# Patient Record
Sex: MALE | Race: WHITE
[De-identification: names, ages, dates, MRNs, and addresses within clinical notes are randomized per-mention and may not be internally consistent; named-entity substitution may affect disease eponyms.]

---

## 2019-02-18 ENCOUNTER — HOSPITAL ENCOUNTER (EMERGENCY)
Dept: HOSPITAL 80 - FED | Age: 24
LOS: 1 days | Discharge: HOME | End: 2019-02-19
Payer: COMMERCIAL

## 2019-02-18 DIAGNOSIS — M24.412: Primary | ICD-10-CM
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
## 2019-02-18 PROCEDURE — 0RSKXZZ REPOSITION LEFT SHOULDER JOINT, EXTERNAL APPROACH: ICD-10-PCS | Performed by: EMERGENCY MEDICINE

## 2019-02-18 NOTE — EDPHY
H & P


Stated Complaint: LEFT shoulder dislocation playing volleyball. +2 pulse


Time Seen by Provider: 02/18/19 23:20


HPI/ROS: 





HPI





CHIEF COMPLAINT:  Left shoulder dislocation.





HISTORY OF PRESENT ILLNESS:  The patient is otherwise healthy 23-year-old male, 

no significant medical history but has had previous 8 left shoulder 

dislocations.  Patient arrives to the emergency room stating he was playing 

volleyball tonight and his left shoulder became dislocated.  Denies any focal 

weakness numbness or tingling.  He complains of left lateral shoulder pain.  

Patient denies any chest pain or shortness of breath.


Good distal pulse, good cap refill, good  strength.





Past Medical History:  Denies significant medical history





Past Surgical History:  Denies significant surgical history





Social History:  Denies drugs alcohol tobacco.  [employer] student





Family History:  Noncontributory








ROS   


REVIEW OF SYSTEMS:


10 Systems were reviewed and negative with the exception of the elements 

mentioned in the history of present illness.








Exam   


Constitutional   triage nursing summary reviewed, vital signs reviewed, awake/

alert.  Vital signs stable


Eyes   normal conjunctivae and sclera, EOMI, PERRLA. 


HENT   normal inspection, atraumatic, moist mucus membranes, no epistaxis, neck 

supple/ no meningismus, no raccoon eyes. 


Respiratory   clear to auscultation bilaterally, normal breath sounds, no 

respiratory distress, no wheezing. 


Cardiovascular   rate normal, regular rhythm, no murmur, no edema, distal 

pulses normal. 


Gastrointestinal   soft, non-tender, no rebound, no guarding, normal bowel 

sounds, no distension, no pulsatile mass. 


Genitourinary   no CVA tenderness. 


Musculoskeletal  left arm:  Neurovascular intact good distal pulse, good cap 

refill, axillary nerve intact, limited range of motion due to anterior shoulder 

dislocation on exam.





no midline vertebral tenderness, full range of motion, no calf swelling, no 

tenderness of extremities, no meningismus, good pulses, neurovascularly intact.


Skin   pink, warm, & dry, no rash, skin atraumatic. 


Neurologic   awake, alert and oriented x 3, AAOx3, moves all 4 extremities 

equally, motor intact, sensory intact, CN II-XII intact, normal cerebellar, 

normal vision, normal speech. 


Psychiatric   normal mood/affect. 


Heme/Lymph/Immune   no lymphadenopathy.





Differential Diagnosis:  Includes but is not limited to in a particular order 

anterior shoulder dislocation, fracture





Medical Decision Making:  Plan for this patient I was able to reduce it without 

conscious sedation.  He tolerated this very well.  Post reduction x-ray 

pending.  He is neurovascular intact, axillary nerve intact, patient placed in 

a sling.





Re-evaluation:














2331:  I was able to apply direct downward traction and some external rotation 

and the patient's left anterior shoulder dislocation was relocated.  Post 

relocation is placed in a sling.  He is neurovascular intact good cap refill.








X-ray pending.





Patient tolerated this very well.








I encouraged patient to follow up with Orthopedics as this is his 9th shoulder 

dislocation.





X-ray left shoulder reviewed.  Appropriate placement.  No fracture.





Patient re-examined 2348:  Patient resting comfortably no acute distress.  He 

has left arm is neurovascularly intact.  Good distal pulse, good cap refill, 

axillary nerve intact.  Sensation intact.


Good  strength.





Recommend following up closely with Orthopedics.


Sling for comfort.


Return precautions discussed.


Source: Patient





- Personal History


Current Tetanus/Diphtheria Vaccine: Yes


Current Tetanus Diphtheria and Acellular Pertussis (TDAP): Yes





- Medical/Surgical History


Hx Asthma: No


Hx Chronic Respiratory Disease: No


Hx Diabetes: No


Hx Cardiac Disease: No


Hx Renal Disease: No


Hx Cirrhosis: No


Hx Alcoholism: No


Hx HIV/AIDS: No


Hx Splenectomy or Spleen Trauma: No


Other PMH: numerous L shoulder dislocations





- Social History


Smoking Status: Never smoked


Constitutional: 


 Initial Vital Signs











Temperature (C)  37.1 C   02/18/19 23:11


 


Heart Rate  105 H  02/18/19 23:11


 


Respiratory Rate  20   02/18/19 23:11


 


Blood Pressure  170/94 H  02/18/19 23:11


 


O2 Sat (%)  97   02/18/19 23:11








 











O2 Delivery Mode               Room Air














Allergies/Adverse Reactions: 


 





No Known Allergies Allergy (Unverified 02/18/19 23:11)


 








Home Medications: 














 Medication  Instructions  Recorded


 


NK [No Known Home Meds]  02/18/19














Departure





- Departure


Disposition: Home, Routine, Self-Care


Clinical Impression: 


 Shoulder dislocation





Condition: Good


Instructions:  Shoulder Dislocation (ED)


Additional Instructions: 


1. Recommend ice.


2. Recommend anti-inflammatory pain medicine Tylenol and/or Motrin every 6-8 

hours for pain control


3. I do recommend he follow up with Orthopedics as you have had recurrent 

shoulder dislocation.  You should follow up with Orthopedics stay in your sling 

until seen by them.


Referrals: 


NONE *PRIMARY CARE P,. [Primary Care Provider] - As per Instructions


Vinnie Davis MD [Medical Doctor] - As per Instructions

## 2019-02-19 VITALS — DIASTOLIC BLOOD PRESSURE: 80 MMHG | SYSTOLIC BLOOD PRESSURE: 146 MMHG
